# Patient Record
Sex: FEMALE | Race: BLACK OR AFRICAN AMERICAN | Employment: FULL TIME | URBAN - METROPOLITAN AREA
[De-identification: names, ages, dates, MRNs, and addresses within clinical notes are randomized per-mention and may not be internally consistent; named-entity substitution may affect disease eponyms.]

---

## 2023-10-24 ENCOUNTER — LAB ENCOUNTER (OUTPATIENT)
Dept: LAB | Facility: REFERENCE LAB | Age: 63
End: 2023-10-24
Attending: FAMILY MEDICINE

## 2023-10-24 ENCOUNTER — OFFICE VISIT (OUTPATIENT)
Facility: CLINIC | Age: 63
End: 2023-10-24

## 2023-10-24 VITALS
BODY MASS INDEX: 27.64 KG/M2 | WEIGHT: 172 LBS | DIASTOLIC BLOOD PRESSURE: 70 MMHG | SYSTOLIC BLOOD PRESSURE: 126 MMHG | HEIGHT: 66 IN | OXYGEN SATURATION: 98 % | HEART RATE: 68 BPM

## 2023-10-24 DIAGNOSIS — Z12.11 COLON CANCER SCREENING: ICD-10-CM

## 2023-10-24 DIAGNOSIS — M25.552 LEFT HIP PAIN: ICD-10-CM

## 2023-10-24 DIAGNOSIS — Z00.00 ANNUAL PHYSICAL EXAM: Primary | ICD-10-CM

## 2023-10-24 DIAGNOSIS — E78.00 HIGH BLOOD CHOLESTEROL: ICD-10-CM

## 2023-10-24 DIAGNOSIS — R73.9 HYPERGLYCEMIA: ICD-10-CM

## 2023-10-24 DIAGNOSIS — E87.6 HYPOKALEMIA: ICD-10-CM

## 2023-10-24 DIAGNOSIS — Z12.4 SCREENING FOR CERVICAL CANCER: ICD-10-CM

## 2023-10-24 DIAGNOSIS — I10 BENIGN ESSENTIAL HTN: ICD-10-CM

## 2023-10-24 DIAGNOSIS — Z12.31 VISIT FOR SCREENING MAMMOGRAM: ICD-10-CM

## 2023-10-24 LAB
ANION GAP SERPL CALC-SCNC: 8 MMOL/L (ref 0–18)
BASOPHILS # BLD AUTO: 0.04 X10(3) UL (ref 0–0.2)
BASOPHILS NFR BLD AUTO: 0.8 %
BUN BLD-MCNC: 17 MG/DL (ref 7–18)
BUN/CREAT SERPL: 21 (ref 10–20)
CALCIUM BLD-MCNC: 9.1 MG/DL (ref 8.5–10.1)
CHLORIDE SERPL-SCNC: 110 MMOL/L (ref 98–112)
CHOLEST SERPL-MCNC: 165 MG/DL (ref ?–200)
CO2 SERPL-SCNC: 26 MMOL/L (ref 21–32)
CREAT BLD-MCNC: 0.81 MG/DL
DEPRECATED RDW RBC AUTO: 42 FL (ref 35.1–46.3)
EGFRCR SERPLBLD CKD-EPI 2021: 82 ML/MIN/1.73M2 (ref 60–?)
EOSINOPHIL # BLD AUTO: 0.08 X10(3) UL (ref 0–0.7)
EOSINOPHIL NFR BLD AUTO: 1.7 %
ERYTHROCYTE [DISTWIDTH] IN BLOOD BY AUTOMATED COUNT: 14.1 % (ref 11–15)
EST. AVERAGE GLUCOSE BLD GHB EST-MCNC: 123 MG/DL (ref 68–126)
FASTING PATIENT LIPID ANSWER: YES
FASTING STATUS PATIENT QL REPORTED: YES
GLUCOSE BLD-MCNC: 103 MG/DL (ref 70–99)
HBA1C MFR BLD: 5.9 % (ref ?–5.7)
HCT VFR BLD AUTO: 40.6 %
HDLC SERPL-MCNC: 62 MG/DL (ref 40–59)
HGB BLD-MCNC: 12.9 G/DL
IMM GRANULOCYTES # BLD AUTO: 0.01 X10(3) UL (ref 0–1)
IMM GRANULOCYTES NFR BLD: 0.2 %
LDLC SERPL CALC-MCNC: 91 MG/DL (ref ?–100)
LYMPHOCYTES # BLD AUTO: 1.83 X10(3) UL (ref 1–4)
LYMPHOCYTES NFR BLD AUTO: 37.8 %
MCH RBC QN AUTO: 26.3 PG (ref 26–34)
MCHC RBC AUTO-ENTMCNC: 31.8 G/DL (ref 31–37)
MCV RBC AUTO: 82.7 FL
MONOCYTES # BLD AUTO: 0.41 X10(3) UL (ref 0.1–1)
MONOCYTES NFR BLD AUTO: 8.5 %
NEUTROPHILS # BLD AUTO: 2.47 X10 (3) UL (ref 1.5–7.7)
NEUTROPHILS # BLD AUTO: 2.47 X10(3) UL (ref 1.5–7.7)
NEUTROPHILS NFR BLD AUTO: 51 %
NONHDLC SERPL-MCNC: 103 MG/DL (ref ?–130)
OSMOLALITY SERPL CALC.SUM OF ELEC: 300 MOSM/KG (ref 275–295)
PLATELET # BLD AUTO: 339 10(3)UL (ref 150–450)
POTASSIUM SERPL-SCNC: 3.6 MMOL/L (ref 3.5–5.1)
RBC # BLD AUTO: 4.91 X10(6)UL
SODIUM SERPL-SCNC: 144 MMOL/L (ref 136–145)
TRIGL SERPL-MCNC: 58 MG/DL (ref 30–149)
VLDLC SERPL CALC-MCNC: 9 MG/DL (ref 0–30)
WBC # BLD AUTO: 4.8 X10(3) UL (ref 4–11)

## 2023-10-24 PROCEDURE — 88175 CYTOPATH C/V AUTO FLUID REDO: CPT | Performed by: FAMILY MEDICINE

## 2023-10-24 PROCEDURE — 3074F SYST BP LT 130 MM HG: CPT | Performed by: FAMILY MEDICINE

## 2023-10-24 PROCEDURE — 85025 COMPLETE CBC W/AUTO DIFF WBC: CPT | Performed by: FAMILY MEDICINE

## 2023-10-24 PROCEDURE — 3008F BODY MASS INDEX DOCD: CPT | Performed by: FAMILY MEDICINE

## 2023-10-24 PROCEDURE — 3078F DIAST BP <80 MM HG: CPT | Performed by: FAMILY MEDICINE

## 2023-10-24 PROCEDURE — 99386 PREV VISIT NEW AGE 40-64: CPT | Performed by: FAMILY MEDICINE

## 2023-10-24 PROCEDURE — 87624 HPV HI-RISK TYP POOLED RSLT: CPT | Performed by: FAMILY MEDICINE

## 2023-10-24 PROCEDURE — 80061 LIPID PANEL: CPT | Performed by: FAMILY MEDICINE

## 2023-10-24 PROCEDURE — 36415 COLL VENOUS BLD VENIPUNCTURE: CPT | Performed by: FAMILY MEDICINE

## 2023-10-24 PROCEDURE — 83036 HEMOGLOBIN GLYCOSYLATED A1C: CPT | Performed by: FAMILY MEDICINE

## 2023-10-24 PROCEDURE — 80048 BASIC METABOLIC PNL TOTAL CA: CPT | Performed by: FAMILY MEDICINE

## 2023-10-24 RX ORDER — HYDROCHLOROTHIAZIDE 25 MG/1
25 TABLET ORAL DAILY
COMMUNITY

## 2023-10-24 RX ORDER — POTASSIUM CHLORIDE 20 MEQ/1
1 TABLET, EXTENDED RELEASE ORAL DAILY
COMMUNITY
Start: 2023-08-02 | End: 2023-10-24

## 2023-10-24 RX ORDER — POTASSIUM CHLORIDE 20 MEQ/1
10 TABLET, EXTENDED RELEASE ORAL DAILY
Qty: 45 TABLET | Refills: 3 | Status: SHIPPED | OUTPATIENT
Start: 2023-10-24 | End: 2024-10-18

## 2023-10-24 RX ORDER — ATORVASTATIN CALCIUM 20 MG/1
20 TABLET, FILM COATED ORAL DAILY
COMMUNITY

## 2023-10-25 LAB — HPV I/H RISK 1 DNA SPEC QL NAA+PROBE: NEGATIVE

## 2023-10-26 PROBLEM — R73.03 PREDIABETES: Status: ACTIVE | Noted: 2023-10-26

## 2023-11-28 RX ORDER — ATORVASTATIN CALCIUM 20 MG/1
20 TABLET, FILM COATED ORAL DAILY
Qty: 90 TABLET | Refills: 3 | Status: SHIPPED | OUTPATIENT
Start: 2023-11-28

## 2023-11-28 NOTE — TELEPHONE ENCOUNTER
LISTED AS EXTERNAL/HISTORICAL. Please review; protocol failed/no protocol. Requested Prescriptions   Pending Prescriptions Disp Refills    atorvastatin 20 MG Oral Tab 90 tablet 1     Sig: Take 1 tablet (20 mg total) by mouth daily.        Cholesterol Medication Protocol Failed - 11/27/2023 10:38 AM        Failed - ALT in past 12 months        Failed - Last ALT < 80     No results found for: \"ALT\"          Passed - LDL in past 12 months        Passed - Last LDL < 130     Lab Results   Component Value Date    LDL 91 10/24/2023             Passed - In person appointment or virtual visit in the past 12 mos or appointment in next 3 mos     Recent Outpatient Visits              1 month ago Annual physical exam    [address], [address], [facility] Caden Munson MD    Office Visit          Future Appointments         Provider Department Appt Notes    In 3 months Vadim Brice MD [address], [address], [place] Hemorrhoid / Colon Ca Screening                     Recent Outpatient Visits              1 month ago Annual physical exam    [address], [address], [facility] Caden Munson MD    Office Visit             Future Appointments         Provider Department Appt Notes    In 3 months Vadim Brice MD [address], [address], [place] Hemorrhoid / Colon Ca Screening

## 2024-03-07 ENCOUNTER — TELEPHONE (OUTPATIENT)
Facility: CLINIC | Age: 64
End: 2024-03-07

## 2024-03-07 ENCOUNTER — OFFICE VISIT (OUTPATIENT)
Facility: CLINIC | Age: 64
End: 2024-03-07
Payer: COMMERCIAL

## 2024-03-07 VITALS
HEART RATE: 109 BPM | SYSTOLIC BLOOD PRESSURE: 145 MMHG | WEIGHT: 187 LBS | BODY MASS INDEX: 30.05 KG/M2 | HEIGHT: 66 IN | DIASTOLIC BLOOD PRESSURE: 87 MMHG

## 2024-03-07 DIAGNOSIS — Z12.11 COLON CANCER SCREENING: Primary | ICD-10-CM

## 2024-03-07 DIAGNOSIS — Z12.11 SCREEN FOR COLON CANCER: Primary | ICD-10-CM

## 2024-03-07 PROCEDURE — S0285 CNSLT BEFORE SCREEN COLONOSC: HCPCS | Performed by: INTERNAL MEDICINE

## 2024-03-07 NOTE — PATIENT INSTRUCTIONS
1. Schedule colonoscopy with [facility] [Diagnosis: Screening]    2.  bowel prep from pharmacy ([facility])  --Buy over the counter dulcolax laxative, and take one tablet daily for 3 days prior to drinking the bowel prep.       3. Continue all medications as normal for your procedure.    4. Read all bowel prep instructions carefully. Bowel prep instructions can also be found online at:  [email or web address]     5. AVOID seeds, nuts, popcorn, raw fruits and vegetables for 3 days before procedure    6. You MAY need to go for COVID testing 72 hours before procedure. The testing team will call you a few days before your procedure to discuss with you if testing is required. If you are asked to go for COVID testing and do not completed the test, the procedure cannot be performed.     7. If you start any NEW medication after your visit today, please notify us. Certain medications (like iron or weight loss medications) will need to be held before the procedure, or the procedure cannot be performed safely.

## 2024-03-07 NOTE — TELEPHONE ENCOUNTER
Scheduled for:  Colonoscopy [id number]  Provider Name:  Dr. Crystal   Date:  06/03/2024  Location:[facility]  Sedation:  MAC  Time:  10:30am (pt is aware that [facility] will call the day before to confirm arrival time)    Prep:  Trilyte Prep Instructions Given At The Office Visit.    Meds/Allergies Reconciled?:  Physician Reviewed   Diagnosis with codes:  Colon Screening Z12.11  Was patient informed to call insurance with codes (Y/N):  Yes  Referral sent?:  Referral was sent at the time of electronic surgical scheduling.  [facility] or [facility] notified?:  I sent an electronic request to Endo Scheduling and received a confirmation today.  Medication Orders:  Pt is aware to NOT take iron pills, herbal meds and diet supplements for 7 days before exam. Also to NOT take any form of alcohol, recreational drugs and any forms of ED meds 24 hours before exam.   Misc Orders:       Further instructions given by staff:  I provide prep instructions to patient at the time of the appointment and reviewed date, time and location, she verbalized that she understood and is aware to call if she has any questions.    Patient was informed about the new cancellation policy for his/her procedure. Patient was also given a copy of the cancellation policy at the time of the appointment and verbalized understanding.

## 2024-03-07 NOTE — H&P
[facility] - Gastroenterology                                                                                                  Clinic History and Physical     Chief Complaint   Patient presents with    Consult       Requesting physician or provider: Lima Prajapati MD    HPI:   Clhoe Kennedy is a 64 year old year-old female with history of HL, rhinitis, who presents for colon cancer screening evaluation.    Patient denies any GI symptoms of nausea, vomiting, dyspepsia, dysphagia, hematemesis, abdominal pain, change in bowel habits, thin stools, hematochezia, or melena.  Additionally there is no weight loss and no reported history of chest pain or shortness of breath.  -no family history of colon cancer.  -no significant constipation issues.    Prior endoscopies:  2015 - [facility] - no sig findings, no polyps    Soc:  -No smoking  -Social Etoh    History, Medications, Allergies, ROS:      Past Medical History:   Diagnosis Date    Allergic rhinitis 2010    Hyperlipidemia       Past Surgical History:   Procedure Laterality Date    COLONOSCOPY      OTHER SURGICAL HISTORY  2015    Cosmetic - upper arm reduction    TUBAL LIGATION  1987      Family Hx:   Family History   Problem Relation Age of Onset    Diabetes Mother     Hypertension Mother     Diabetes Father     Hypertension Father     Cancer Maternal Grandfather     Cancer Maternal Grandmother     Asthma Daughter       Social History:   Social History     Socioeconomic History    Marital status:    Tobacco Use    Smoking status: Never    Smokeless tobacco: Never   Substance and Sexual Activity    Alcohol use: Yes     Comment: One glass per month    Drug use: Never   Other Topics Concern    Caffeine Concern No    Exercise Yes     Comment: Need to do more    Seat Belt Yes    Special Diet No    Stress Concern Yes     Comment: Relocating    Weight Concern Yes     Comment: Working  to achieve the right number of pounds.        Medications (Active prior to today's visit):  Current Outpatient Medications   Medication Sig Dispense Refill    polyethylene glycol, PEG 3350-KCl-NaBcb-NaCl-NaSulf, 236 g Oral Recon Soln Take 4,000 mL by mouth once for 1 dose. As directed by GI clinic instructions. 4000 mL 0    atorvastatin 20 MG Oral Tab Take 1 tablet (20 mg total) by mouth daily. 90 tablet 3    hydroCHLOROthiazide 25 MG Oral Tab Take 1 tablet (25 mg total) by mouth daily.      potassium chloride 20 MEQ Oral Tab CR Take 0.5 tablets (10 mEq total) by mouth daily. Patient takes 1/2 a tablet 45 tablet 3       Allergies:  No Known Allergies    ROS:   CONSTITUTIONAL:  negative for fevers, rigors  EYES:  negative for diplopia   RESPIRATORY:  negative for severe shortness of breath  CARDIOVASCULAR:  negative for crushing sub-sternal chest pain  GASTROINTESTINAL:  see HPI  GENITOURINARY:  negative for dysuria or gross hematuria  INTEGUMENT/BREAST:  SKIN:  negative for jaundice   ALLERGIC/IMMUNOLOGIC:  negative for hay fever  ENDOCRINE:  negative for cold intolerance and heat intolerance  MUSCULOSKELETAL:  negative for joint effusion/severe erythema  BEHAVIOR/PSYCH:  negative for psychotic behavior      PHYSICAL EXAM:   Blood pressure 145/87, pulse 109, height 5' 6\" (1.676 m), weight 187 lb (84.8 kg).    Gen- Patient appears comfortable and in no acute discomfort  HEENT: the sclera appears anicteric, oropharynx clear, mucus membranes appear moist  CV- regular rate and rhythm, the extremities are warm and well perfused   Lung- Moves air well; No labored breathing  Abdomen- soft, non-tender exam in all quadrants without rigidity or guarding, non-distended, no abnormal bowel sounds noted, no masses are palpated  Skin- No jaundice  Ext: no cyanosis, clubbing or edema is evident.   Neuro- Alert and interactive, and gross movements of extremities normal    Labs/Imaging:     Patient's labs and imaging were reviewed  and discussed with patient today.      .  ASSESSMENT/PLAN:   Chloe Kennedy is a 64 year old year-old female with history of HL, rhinitis, who presents for colon cancer screening evaluation. NO anemia noted on lab work.    # Average Risk screening: patient is considered average risk for colon cancer (NO family hx of colon cancer) and it is appropriate to proceed with screening colonoscopy. Patient is currently asymptomatic and denies diarrhea, hematochezia, thin-stools or weight loss. We discussed risks/benefits/alternatives to procedure, including CT colonography and stool testing, they want to proceed with colonoscopy.    Recommend:  1. Schedule colonoscopy with [facility] [Diagnosis: Screening]    2.  bowel prep from pharmacy ([facility])  --Buy over the counter dulcolax laxative, and take one tablet daily for 3 days prior to drinking the bowel prep.       3. Continue all medications as normal for your procedure.    4. Read all bowel prep instructions carefully. Bowel prep instructions can also be found online at:  [email or web address]     5. AVOID seeds, nuts, popcorn, raw fruits and vegetables for 3 days before procedure    6. You MAY need to go for COVID testing 72 hours before procedure. The testing team will call you a few days before your procedure to discuss with you if testing is required. If you are asked to go for COVID testing and do not completed the test, the procedure cannot be performed.     7. If you start any NEW medication after your visit today, please notify us. Certain medications (like iron or weight loss medications) will need to be held before the procedure, or the procedure cannot be performed safely.      Colonoscopy consent: I have discussed the risks, benefits, and alternatives to colonoscopy with the patient [who demonstrated understanding], including but not limited to the risks of bleeding, infection, pain, death, as well as the risks of anesthesia and perforation all leading  to prolonged hospitalization, surgical intervention, or even death. I also specifically mentioned the miss rate of colonoscopy of 5-10% in the best of all circumstances. All questions were answered to the patient’s satisfaction. The patient signed informed consent and elected to proceed with colonoscopy with intervention [i.e. polypectomy, stent placement, etc.] as indicated.      Orders This Visit:  No orders of the defined types were placed in this encounter.      Meds This Visit:  Requested Prescriptions     Signed Prescriptions Disp Refills    polyethylene glycol, PEG 3350-KCl-NaBcb-NaCl-NaSulf, 236 g Oral Recon Soln 4000 mL 0     Sig: Take 4,000 mL by mouth once for 1 dose. As directed by GI clinic instructions.       Imaging & Referrals:  None         DIMITRY Crystal MD  Pager: [phone]  3/7/2024

## 2024-03-27 RX ORDER — HYDROCHLOROTHIAZIDE 25 MG/1
25 TABLET ORAL DAILY
Qty: 90 TABLET | Refills: 0 | Status: SHIPPED | OUTPATIENT
Start: 2024-03-27

## 2024-03-27 NOTE — TELEPHONE ENCOUNTER
Please review; protocol failed/ has no protocol    Medication is listed as patient reported.    Requested Prescriptions   Pending Prescriptions Disp Refills    hydroCHLOROthiazide 25 MG Oral Tab  0     Sig: Take 1 tablet (25 mg total) by mouth daily.       Hypertension Medications Protocol Failed - 3/25/2024  4:04 PM        Failed - Last BP reading less than 140/90     BP Readings from Last 1 Encounters:   03/07/24 145/87               Passed - CMP or BMP in past 12 months        Passed - In person appointment or virtual visit in the past 12 mos or appointment in next 3 mos     Recent Outpatient Visits              2 [facility]    [facility]    5 months ago Annual physical exam    [facility] Lima Glynn MD    Office Visit          Future Appointments         Provider Department Appt Notes    [facility] 2 [facility]               Passed - EGFRCR or GFRAA > 50     GFR Evaluation  EGFRCR: 82 , resulted on 10/24/2023             Recent Outpatient Visits              2 weeks ago [facility] colon [facility]    Office Visit    5 months ago Annual physical exam    [facility] Lima Glynn MD    Office Visit          Future Appointments         Provider Department Appt Notes    In 2 months [facility]

## 2024-04-11 ENCOUNTER — TELEPHONE (OUTPATIENT)
Facility: CLINIC | Age: 64
End: 2024-04-11

## 2024-04-13 ENCOUNTER — TELEPHONE (OUTPATIENT)
Facility: CLINIC | Age: 64
End: 2024-04-13

## 2024-04-18 ENCOUNTER — OFFICE VISIT (OUTPATIENT)
Facility: CLINIC | Age: 64
End: 2024-04-18
Payer: COMMERCIAL

## 2024-04-18 VITALS
OXYGEN SATURATION: 100 % | HEART RATE: 67 BPM | SYSTOLIC BLOOD PRESSURE: 136 MMHG | DIASTOLIC BLOOD PRESSURE: 78 MMHG | WEIGHT: 188.13 LBS | BODY MASS INDEX: 30 KG/M2

## 2024-04-18 DIAGNOSIS — Z23 NEED FOR VACCINATION: ICD-10-CM

## 2024-04-18 DIAGNOSIS — I10 BENIGN ESSENTIAL HTN: Primary | ICD-10-CM

## 2024-04-18 PROCEDURE — 90471 IMMUNIZATION ADMIN: CPT | Performed by: FAMILY MEDICINE

## 2024-04-18 PROCEDURE — 96127 BRIEF EMOTIONAL/BEHAV ASSMT: CPT | Performed by: FAMILY MEDICINE

## 2024-04-18 PROCEDURE — 99213 OFFICE O/P EST LOW 20 MIN: CPT | Performed by: FAMILY MEDICINE

## 2024-04-18 PROCEDURE — 90715 TDAP VACCINE 7 YRS/> IM: CPT | Performed by: FAMILY MEDICINE

## 2024-04-18 NOTE — PROGRESS NOTES
Subjective:   Chloe Kennedy is a 64 year old female who presents for Hypertension (Patient comes to the office to follow up on hypertension. )     Patient presents for follow up BP. Doing well on hydrochlorothiazide. Patient checks BP at home max 131-113/84-71    Health Maintenance   Has colonoscopy scheduled for 6/3/24  Mammogram ordered 10/2023      History/Other:    Chief Complaint Reviewed and Verified  Nursing Notes Reviewed and   Verified  Tobacco Reviewed  Allergies Reviewed  Medications Reviewed    Problem List Reviewed  Medical History Reviewed  Surgical History   Reviewed  Family History Reviewed  Social History Reviewed         Tobacco:  She has never smoked tobacco.    Current Outpatient Medications   Medication Sig Dispense Refill    hydroCHLOROthiazide 25 MG Oral Tab Take 1 tablet (25 mg total) by mouth daily. 90 tablet 0    atorvastatin 20 MG Oral Tab Take 1 tablet (20 mg total) by mouth daily. 90 tablet 3    potassium chloride 20 MEQ Oral Tab CR Take 0.5 tablets (10 mEq total) by mouth daily. Patient takes 1/2 a tablet 45 tablet 3         Review of Systems:  Review of Systems   Constitutional: Negative.    Respiratory: Negative.     Cardiovascular: Negative.    Gastrointestinal: Negative.    Skin: Negative.    Neurological: Negative.          Objective:   /78 (BP Location: Left arm, Patient Position: Sitting, Cuff Size: adult)   Pulse 67   Wt 188 lb 2 oz (85.3 kg)   SpO2 100%   BMI 30.36 kg/m²  Estimated body mass index is 30.36 kg/m² as calculated from the following:    Height as of 3/7/24: 5' 6\" (1.676 m).    Weight as of this encounter: 188 lb 2 oz (85.3 kg).  Physical Exam  Vitals and nursing note reviewed.   Constitutional:       General: She is not in acute distress.     Appearance: Normal appearance.   HENT:      Head: Normocephalic and atraumatic.   Eyes:      General:         Right eye: No discharge.         Left eye: No discharge.      Comments: Extraocular eye movements  grossly intact   Pulmonary:      Effort: Pulmonary effort is normal.   Musculoskeletal:      Comments: Normal gait   Skin:     Findings: No rash.   Neurological:      General: No focal deficit present.      Mental Status: She is alert. Mental status is at baseline.   Psychiatric:         Mood and Affect: Mood normal.         Behavior: Behavior normal.         Assessment & Plan:   1. Benign essential HTN (Primary)  BP well controlled. Remains under goal of less than 140/90. Patient thinks elevated blood pressure at Dr Crystal's office was due to some health anxiety    2. Need for vaccination  -     TETANUS, DIPHTHERIA TOXOIDS AND ACELLULAR PERTUSIS VACCINE (TDAP), >7 YEARS, IM USE    -administered by staff  -also counseled on shingrix. Patient will continue to think about this vaccine      Return in about 3 months (around 7/18/2024) for HTN.    Lima Prajapati MD, 4/18/2024, 6:29 PM

## 2024-04-29 ENCOUNTER — PATIENT MESSAGE (OUTPATIENT)
Facility: CLINIC | Age: 64
End: 2024-04-29

## 2024-04-29 NOTE — TELEPHONE ENCOUNTER
Farzad April, RN 4/29/2024 9:10 AM CDT        ----- Message -----  From: Chloe Kennedy  Sent: 4/29/2024 7:43 AM CDT  To: Em Triage Support  Subject: Week of 4/22 BP readings     Hi Dr. Prajapati,   Please find attached last week’s BP results.     Chloe Lopez

## 2024-05-30 ENCOUNTER — HOSPITAL ENCOUNTER (OUTPATIENT)
Dept: MAMMOGRAPHY | Age: 64
Discharge: HOME | End: 2024-05-30
Attending: FAMILY MEDICINE
Payer: COMMERCIAL

## 2024-05-30 DIAGNOSIS — Z12.31 VISIT FOR SCREENING MAMMOGRAM: ICD-10-CM

## 2024-05-30 PROCEDURE — 77067 SCR MAMMO BI INCL CAD: CPT | Performed by: FAMILY MEDICINE

## 2024-05-30 PROCEDURE — 77063 BREAST TOMOSYNTHESIS BI: CPT | Performed by: FAMILY MEDICINE

## 2024-06-03 PROBLEM — K63.5 POLYP OF COLON: Status: ACTIVE | Noted: 2024-06-03

## 2024-06-03 PROCEDURE — 88305 TISSUE EXAM BY PATHOLOGIST: CPT | Performed by: INTERNAL MEDICINE

## 2024-06-25 ENCOUNTER — PATIENT MESSAGE (OUTPATIENT)
Facility: CLINIC | Age: 64
End: 2024-06-25

## 2024-06-25 ENCOUNTER — TELEPHONE (OUTPATIENT)
Facility: CLINIC | Age: 64
End: 2024-06-25

## 2024-06-25 RX ORDER — HYDROCHLOROTHIAZIDE 25 MG/1
25 TABLET ORAL DAILY
Qty: 90 TABLET | Refills: 3 | Status: SHIPPED | OUTPATIENT
Start: 2024-06-25

## 2024-06-25 NOTE — TELEPHONE ENCOUNTER
Please review. Protocol Failed; No Protocol    Requested Prescriptions   Pending Prescriptions Disp Refills    HYDROCHLOROTHIAZIDE 25 MG Oral Tab [Pharmacy Med Name: HYDROCHLOROTHIAZIDE 25 MG TAB] 90 tablet 0     Sig: TAKE 1 TABLET BY MOUTH EVERY DAY       Hypertension Medications Protocol Failed - 6/22/2024  7:02 AM        Failed - Last BP reading less than 140/90     BP Readings from Last 1 Encounters:   06/03/24 144/86               Passed - CMP or BMP in past 12 months        Passed - In person appointment or virtual visit in the past 12 mos or appointment in next 3 mos     Recent Outpatient Visits              2 months ago Benign essential HTN    [facility] Lima Glynn MD    Office Visit    3 months ago Screen for colon cancer    [facility]    Office Visit    8 months ago Annual physical exam    [facility] Lima Glynn MD    Office Visit                      Passed - EGFRCR or GFRAA > 50     GFR Evaluation  EGFRCR: 82 , resulted on 10/24/2023                   Recent Outpatient Visits              2 months ago [facility]ign essential HT[facility]    [facility]    3 months ago Screen for colon cancer    [facility]    Office Visit    8 months ago Annual physical exam    [facility] Lima Glynn MD    Office Visit

## 2024-06-25 NOTE — TELEPHONE ENCOUNTER
I mailed out colonoscopy results letter to pt  Updated health maintenance  Entered into 5 yr CLN recall  Recall colon in 5 years per. Colon done 6/03/2024      LUANA Crystal MD  P Em Gi Clinical Staff  GI staff: please place recall for colonoscopy in 5 years

## 2024-06-26 NOTE — TELEPHONE ENCOUNTER
From: Chloe Kennedy  To: Lima Prajapati  Sent: 6/25/2024 4:26 PM CDT  Subject: Mammogram     Greetings Dr. Prajapati,  I linked my [facility] account in an effort to obtain my prior imaging.   I also attached the commentary.     Kind regards,  Chloe Kennedy

## 2024-06-27 NOTE — TELEPHONE ENCOUNTER
Can we assist in sending this to the radiology department? They were trying to compare previous mammograms. Thank you    Lima Prajapati MD, 06/27/24, 8:17 AM

## 2024-12-12 ENCOUNTER — OFFICE VISIT (OUTPATIENT)
Facility: CLINIC | Age: 64
End: 2024-12-12
Payer: COMMERCIAL

## 2024-12-12 ENCOUNTER — HOSPITAL ENCOUNTER (OUTPATIENT)
Dept: GENERAL RADIOLOGY | Age: 64
Discharge: HOME | End: 2024-12-12
Attending: FAMILY MEDICINE
Payer: COMMERCIAL

## 2024-12-12 ENCOUNTER — LAB ENCOUNTER (OUTPATIENT)
Dept: LAB | Facility: REFERENCE LAB | Age: 64
End: 2024-12-12
Attending: FAMILY MEDICINE
Payer: COMMERCIAL

## 2024-12-12 VITALS
WEIGHT: 189 LBS | SYSTOLIC BLOOD PRESSURE: 138 MMHG | BODY MASS INDEX: 30.37 KG/M2 | HEART RATE: 67 BPM | DIASTOLIC BLOOD PRESSURE: 86 MMHG | HEIGHT: 66 IN | OXYGEN SATURATION: 97 %

## 2024-12-12 DIAGNOSIS — E78.00 HIGH BLOOD CHOLESTEROL: ICD-10-CM

## 2024-12-12 DIAGNOSIS — M25.572 CHRONIC PAIN OF LEFT ANKLE: ICD-10-CM

## 2024-12-12 DIAGNOSIS — E87.6 HYPOKALEMIA: ICD-10-CM

## 2024-12-12 DIAGNOSIS — S99.919A TRAUMATIC INJURY OF ANKLE: ICD-10-CM

## 2024-12-12 DIAGNOSIS — R73.03 PREDIABETES: ICD-10-CM

## 2024-12-12 DIAGNOSIS — G89.29 CHRONIC PAIN OF LEFT ANKLE: ICD-10-CM

## 2024-12-12 DIAGNOSIS — I10 BENIGN ESSENTIAL HTN: ICD-10-CM

## 2024-12-12 DIAGNOSIS — Z00.01 ENCOUNTER FOR GENERAL ADULT MEDICAL EXAMINATION WITH ABNORMAL FINDINGS: Primary | ICD-10-CM

## 2024-12-12 LAB
ANION GAP SERPL CALC-SCNC: 7 MMOL/L (ref 0–18)
BASOPHILS # BLD AUTO: 0.03 X10(3) UL (ref 0–0.2)
BASOPHILS NFR BLD AUTO: 0.5 %
BUN BLD-MCNC: 11 MG/DL (ref 9–23)
BUN/CREAT SERPL: 14.5 (ref 10–20)
CALCIUM BLD-MCNC: 9.8 MG/DL (ref 8.7–10.4)
CHLORIDE SERPL-SCNC: 104 MMOL/L (ref 98–112)
CHOLEST SERPL-MCNC: 192 MG/DL (ref ?–200)
CO2 SERPL-SCNC: 31 MMOL/L (ref 21–32)
CREAT BLD-MCNC: 0.76 MG/DL
DEPRECATED RDW RBC AUTO: 44.2 FL (ref 35.1–46.3)
EGFRCR SERPLBLD CKD-EPI 2021: 88 ML/MIN/1.73M2 (ref 60–?)
EOSINOPHIL # BLD AUTO: 0.11 X10(3) UL (ref 0–0.7)
EOSINOPHIL NFR BLD AUTO: 1.8 %
ERYTHROCYTE [DISTWIDTH] IN BLOOD BY AUTOMATED COUNT: 14.9 % (ref 11–15)
EST. AVERAGE GLUCOSE BLD GHB EST-MCNC: 134 MG/DL (ref 68–126)
FASTING PATIENT LIPID ANSWER: YES
FASTING STATUS PATIENT QL REPORTED: YES
GLUCOSE BLD-MCNC: 104 MG/DL (ref 70–99)
HBA1C MFR BLD: 6.3 % (ref ?–5.7)
HCT VFR BLD AUTO: 41.6 %
HDLC SERPL-MCNC: 52 MG/DL (ref 40–59)
HGB BLD-MCNC: 13.5 G/DL
IMM GRANULOCYTES # BLD AUTO: 0.02 X10(3) UL (ref 0–1)
IMM GRANULOCYTES NFR BLD: 0.3 %
LDLC SERPL CALC-MCNC: 115 MG/DL (ref ?–100)
LYMPHOCYTES # BLD AUTO: 2.25 X10(3) UL (ref 1–4)
LYMPHOCYTES NFR BLD AUTO: 36 %
MCH RBC QN AUTO: 26.4 PG (ref 26–34)
MCHC RBC AUTO-ENTMCNC: 32.5 G/DL (ref 31–37)
MCV RBC AUTO: 81.4 FL
MONOCYTES # BLD AUTO: 0.44 X10(3) UL (ref 0.1–1)
MONOCYTES NFR BLD AUTO: 7 %
NEUTROPHILS # BLD AUTO: 3.4 X10 (3) UL (ref 1.5–7.7)
NEUTROPHILS # BLD AUTO: 3.4 X10(3) UL (ref 1.5–7.7)
NEUTROPHILS NFR BLD AUTO: 54.4 %
NONHDLC SERPL-MCNC: 140 MG/DL (ref ?–130)
OSMOLALITY SERPL CALC.SUM OF ELEC: 294 MOSM/KG (ref 275–295)
PLATELET # BLD AUTO: 438 10(3)UL (ref 150–450)
POTASSIUM SERPL-SCNC: 3.4 MMOL/L (ref 3.5–5.1)
RBC # BLD AUTO: 5.11 X10(6)UL
SODIUM SERPL-SCNC: 142 MMOL/L (ref 136–145)
TRIGL SERPL-MCNC: 143 MG/DL (ref 30–149)
TSI SER-ACNC: 0.92 UIU/ML (ref 0.55–4.78)
VLDLC SERPL CALC-MCNC: 25 MG/DL (ref 0–30)
WBC # BLD AUTO: 6.3 X10(3) UL (ref 4–11)

## 2024-12-12 PROCEDURE — 80061 LIPID PANEL: CPT | Performed by: FAMILY MEDICINE

## 2024-12-12 PROCEDURE — 73610 X-RAY EXAM OF ANKLE: CPT | Performed by: FAMILY MEDICINE

## 2024-12-12 PROCEDURE — 99396 PREV VISIT EST AGE 40-64: CPT | Performed by: FAMILY MEDICINE

## 2024-12-12 PROCEDURE — 83036 HEMOGLOBIN GLYCOSYLATED A1C: CPT | Performed by: FAMILY MEDICINE

## 2024-12-12 PROCEDURE — 84443 ASSAY THYROID STIM HORMONE: CPT | Performed by: FAMILY MEDICINE

## 2024-12-12 PROCEDURE — 99214 OFFICE O/P EST MOD 30 MIN: CPT | Performed by: FAMILY MEDICINE

## 2024-12-12 PROCEDURE — 90656 IIV3 VACC NO PRSV 0.5 ML IM: CPT | Performed by: FAMILY MEDICINE

## 2024-12-12 PROCEDURE — 80048 BASIC METABOLIC PNL TOTAL CA: CPT | Performed by: FAMILY MEDICINE

## 2024-12-12 PROCEDURE — 36415 COLL VENOUS BLD VENIPUNCTURE: CPT | Performed by: FAMILY MEDICINE

## 2024-12-12 PROCEDURE — 85025 COMPLETE CBC W/AUTO DIFF WBC: CPT | Performed by: FAMILY MEDICINE

## 2024-12-12 PROCEDURE — 90471 IMMUNIZATION ADMIN: CPT | Performed by: FAMILY MEDICINE

## 2024-12-12 RX ORDER — POTASSIUM CHLORIDE 1.5 G/1.58G
POWDER, FOR SOLUTION ORAL
COMMUNITY
Start: 2019-10-09

## 2024-12-12 NOTE — PROGRESS NOTES
Subjective:   Chloe Kennedy is a 64 year old female who presents for Physical     Patient presents for physical and concern for left ankle pain and back pain    Left ankle pain  Patient is unsure if hit ankle with door in achilles area. Went to urgent care who thinks maybe sprain. Patient has been icing, elevating, stretching and using compression sleeve. Pain remains intermittent. Cold seems to aggravate.     HTN  On hydrochlorothiazide. Did take today. Did not take Potassium today in preparation for labs. At home Bps ranging from 131-127/87-89 over this past month.     HLD  Doing well on statin. Did take today        History/Other:    Chief Complaint Reviewed and Verified  No Further Nursing Notes to   Review  Tobacco Reviewed  Allergies Reviewed  Medications Reviewed    Problem List Reviewed  Medical History Reviewed  Surgical History   Reviewed  OB Status Reviewed  Family History Reviewed  Social History   Reviewed         Tobacco:  She has never smoked tobacco.    Current Outpatient Medications   Medication Sig Dispense Refill    potassium chloride (KLOR-CON) 20 MEQ Oral Powd Pack       hydroCHLOROthiazide 25 MG Oral Tab Take 1 tablet (25 mg total) by mouth daily. 90 tablet 3    atorvastatin 20 MG Oral Tab Take 1 tablet (20 mg total) by mouth daily. 90 tablet 3         Review of Systems:  Review of Systems   Constitutional: Negative.    HENT: Negative.     Eyes: Negative.    Respiratory: Negative.     Cardiovascular: Negative.    Gastrointestinal: Negative.    Genitourinary: Negative.    Musculoskeletal:         +left ankle pain   Skin: Negative.    Neurological: Negative.    Psychiatric/Behavioral: Negative.           Objective:   /86   Pulse 67   Ht 5' 6\" (1.676 m)   Wt 189 lb (85.7 kg)   SpO2 97%   BMI 30.51 kg/m²  Estimated body mass index is 30.51 kg/m² as calculated from the following:    Height as of this encounter: 5' 6\" (1.676 m).    Weight as of this encounter: 189 lb (85.7  kg).  Physical Exam  Vitals and nursing note reviewed.   Constitutional:       General: She is not in acute distress.     Appearance: Normal appearance. She is not ill-appearing.   HENT:      Head: Normocephalic and atraumatic.      Right Ear: Tympanic membrane normal. There is no impacted cerumen.      Left Ear: Tympanic membrane normal. There is no impacted cerumen.      Mouth/Throat:      Mouth: Mucous membranes are moist.      Pharynx: Oropharynx is clear. No oropharyngeal exudate or posterior oropharyngeal erythema.   Eyes:      General:         Right eye: No discharge.         Left eye: No discharge.      Extraocular Movements: Extraocular movements intact.      Pupils: Pupils are equal, round, and reactive to light.   Cardiovascular:      Rate and Rhythm: Normal rate and regular rhythm.      Heart sounds: Normal heart sounds. No murmur heard.     No friction rub. No gallop.   Pulmonary:      Effort: Pulmonary effort is normal.      Breath sounds: Normal breath sounds. No wheezing, rhonchi or rales.   Abdominal:      General: Abdomen is flat. Bowel sounds are normal. There is no distension.      Palpations: Abdomen is soft. There is no mass.      Tenderness: There is no abdominal tenderness. There is no guarding or rebound.   Musculoskeletal:         General: Normal range of motion.      Cervical back: Normal range of motion.      Right lower leg: No edema.      Left lower leg: No edema.      Comments: +atalgic gait    +dorsiflexion of left ankle with calf squeeze test  +tenderness lateral to left achilles tendon bilaterally   Skin:     General: Skin is warm and dry.      Findings: No rash.   Neurological:      General: No focal deficit present.      Mental Status: She is alert. Mental status is at baseline.   Psychiatric:         Mood and Affect: Mood normal.         Behavior: Behavior normal.         Assessment & Plan:   1. Encounter for general adult medical examination with abnormal findings (Primary)  -      Basic Metabolic Panel (8)  -     Hemoglobin A1C  -     Lipid Panel  -     TSH W Reflex To Free T4  -     CBC With Differential With Platelet  -     Fluzone trivalent vaccine, PF 0.5mL, 6mo+ (75442)    -ordered annual labs    2. Chronic pain of left ankle  3. Traumatic injury of ankle  -     XR ANKLE (MIN 3 VIEWS), LEFT (CPT=73610); Future; Expected date: 12/12/2024    -given persistent pain and limping with traumatic left ankle injury despite home PT, ice elevation and compression, Ordered xray to further assess. If xray normal will attempt to order MRI    4. Benign essential HTN  -stable well controlled on hydrochlorothiazide. No change to regimen for now    5. Prediabetes  -stable doing well. Ordered updated a1c    6. Hypokalemia  Medication induced from hydrochlorothiazide. Doing well on klorcon. Ordered updated BMP today    7. High blood cholesterol  -stable doing well on statin. Ordered updated lipid panel      Return in about 6 months (around 6/12/2025) for HTN.    Lima Prajapati MD, 12/12/2024, 9:43 AM

## 2024-12-14 DIAGNOSIS — R93.6 ABNORMAL X-RAY OF LOWER EXTREMITY: ICD-10-CM

## 2024-12-14 DIAGNOSIS — G89.29 CHRONIC PAIN OF LEFT ANKLE: Primary | ICD-10-CM

## 2024-12-14 DIAGNOSIS — M25.572 CHRONIC PAIN OF LEFT ANKLE: Primary | ICD-10-CM

## 2024-12-14 DIAGNOSIS — S99.919A TRAUMATIC INJURY OF ANKLE: ICD-10-CM

## 2024-12-18 DIAGNOSIS — E87.6 HYPOKALEMIA: ICD-10-CM

## 2024-12-20 RX ORDER — POTASSIUM CHLORIDE 1500 MG/1
20 TABLET, EXTENDED RELEASE ORAL DAILY
Qty: 90 TABLET | Refills: 0 | Status: SHIPPED | OUTPATIENT
Start: 2024-12-20

## 2024-12-20 RX ORDER — ATORVASTATIN CALCIUM 20 MG/1
20 TABLET, FILM COATED ORAL DAILY
Qty: 90 TABLET | Refills: 0 | Status: SHIPPED | OUTPATIENT
Start: 2024-12-20

## 2024-12-20 NOTE — TELEPHONE ENCOUNTER
Please review; protocol failed.    Patient states she is almost out of medication and is going out of town on 12/22/24. She states she will be back in town on 12/29/24 and can do required lab tests if needed when she returns. She is asking that these requests be addressed with high priority.      Requested Prescriptions   Pending Prescriptions Disp Refills    KLOR-CON M20 20 MEQ Oral Tab CR [Pharmacy Med Name: KLOR-CON M20 TABLET] 45 tablet 3     Sig: TAKE 0.5 TABLET (10 MEQ TOTAL) BY MOUTH DAILY.       There is no refill protocol information for this order       ATORVASTATIN 20 MG Oral Tab [Pharmacy Med Name: ATORVASTATIN 20 MG TABLET] 90 tablet 3     Sig: TAKE 1 TABLET BY MOUTH EVERY DAY       Cholesterol Medication Protocol Failed - 12/20/2024  3:14 PM        Failed - ALT < 80     No results found for: \"ALT\"          Failed - ALT resulted within past year        Passed - Lipid panel within past 12 months     Lab Results   Component Value Date    CHOLEST 192 12/12/2024    TRIG 143 12/12/2024    HDL 52 12/12/2024     (H) 12/12/2024    VLDL 25 12/12/2024    NONHDLC 140 (H) 12/12/2024             Passed - In person appointment or virtual visit in the past 12 mos or appointment in next 3 mos     Recent Outpatient Visits              1 week ago Encounter for general adult medical examination with abnormal findings    [facility] Lima Prajapati MD    Office Visit    8 months ago Benign essential HTN    [facility] Lima Glynn MD    Office Visit    9 months ago Screen for colon cancer    [facility] LUANA Crystal MD    Office Visit    1 year ago Annual physical exam    [facility] Lima Glynn MD    Office Visit          Future Appointments         Provider Department Appt Notes    In 3 weeks [facility] MRI MOBILE (1.5T) [facility] MRI                           Future Appointments         Provider Department Appt Notes    In 3 weeks [facility] MRI MOBILE (1.5T) [facility] MRI             Recent Outpatient Visits              1 week ago Encounter for general adult medical examination with abnormal findings    [facility] Lima Glynn MD    Office Visit    8 months ago Benign essential HTN    [facility] Lima Prajapati MD    Office Visit    9 months ago Screen for colon cancer    [facility] LUANA Crystal MD    Office Visit    1 year ago Annual physical exam    [facility] Lima Glynn MD    Office Visit

## 2024-12-20 NOTE — TELEPHONE ENCOUNTER
Verified name and .    Patient calling to follow up on refill requests- states that she needs them as soon as possible because she is going out of town on 24.

## 2024-12-30 ENCOUNTER — LAB ENCOUNTER (OUTPATIENT)
Dept: LAB | Age: 64
End: 2024-12-30
Attending: FAMILY MEDICINE
Payer: COMMERCIAL

## 2024-12-30 DIAGNOSIS — E87.6 HYPOKALEMIA: ICD-10-CM

## 2024-12-30 LAB
ANION GAP SERPL CALC-SCNC: 8 MMOL/L (ref 0–18)
BUN BLD-MCNC: 14 MG/DL (ref 9–23)
BUN/CREAT SERPL: 17.7 (ref 10–20)
CALCIUM BLD-MCNC: 9.1 MG/DL (ref 8.7–10.4)
CHLORIDE SERPL-SCNC: 107 MMOL/L (ref 98–112)
CO2 SERPL-SCNC: 29 MMOL/L (ref 21–32)
CREAT BLD-MCNC: 0.79 MG/DL
EGFRCR SERPLBLD CKD-EPI 2021: 84 ML/MIN/1.73M2 (ref 60–?)
FASTING STATUS PATIENT QL REPORTED: NO
GLUCOSE BLD-MCNC: 112 MG/DL (ref 70–99)
OSMOLALITY SERPL CALC.SUM OF ELEC: 299 MOSM/KG (ref 275–295)
POTASSIUM SERPL-SCNC: 3.5 MMOL/L (ref 3.5–5.1)
SODIUM SERPL-SCNC: 144 MMOL/L (ref 136–145)

## 2024-12-30 PROCEDURE — 80048 BASIC METABOLIC PNL TOTAL CA: CPT

## 2024-12-30 PROCEDURE — 36415 COLL VENOUS BLD VENIPUNCTURE: CPT

## 2025-01-11 ENCOUNTER — HOSPITAL ENCOUNTER (OUTPATIENT)
Dept: MRI IMAGING | Facility: HOSPITAL | Age: 65
Discharge: HOME | End: 2025-01-11
Attending: FAMILY MEDICINE
Payer: COMMERCIAL

## 2025-01-11 DIAGNOSIS — G89.29 CHRONIC PAIN OF LEFT ANKLE: ICD-10-CM

## 2025-01-11 DIAGNOSIS — S99.919A TRAUMATIC INJURY OF ANKLE: ICD-10-CM

## 2025-01-11 DIAGNOSIS — M25.572 CHRONIC PAIN OF LEFT ANKLE: ICD-10-CM

## 2025-01-11 DIAGNOSIS — R93.6 ABNORMAL X-RAY OF LOWER EXTREMITY: ICD-10-CM

## 2025-01-11 PROCEDURE — 73721 MRI JNT OF LWR EXTRE W/O DYE: CPT | Performed by: FAMILY MEDICINE

## 2025-01-14 DIAGNOSIS — S86.012S ACHILLES TENDON TEAR, LEFT, SEQUELA: Primary | ICD-10-CM

## 2025-02-13 ENCOUNTER — E-VISIT (OUTPATIENT)
Dept: TELEHEALTH | Age: 65
End: 2025-02-13
Payer: COMMERCIAL

## 2025-02-13 DIAGNOSIS — J06.9 URI WITH COUGH AND CONGESTION: Primary | ICD-10-CM

## 2025-02-13 DIAGNOSIS — R05.8 SPASMODIC COUGH: ICD-10-CM

## 2025-02-13 PROCEDURE — 99421 OL DIG E/M SVC 5-10 MIN: CPT | Performed by: PHYSICIAN ASSISTANT

## 2025-02-13 NOTE — PROGRESS NOTES
Chloe Kennedy is a 65 year old female who initiated e-visit care today.    HPI:   See answers to questionnaire submission     Current Outpatient Medications   Medication Sig Dispense Refill    potassium chloride (KLOR-CON M20) 20 MEQ Oral Tab CR Take 1 tablet (20 mEq total) by mouth daily. 90 tablet 0    atorvastatin 20 MG Oral Tab Take 1 tablet (20 mg total) by mouth daily. 90 tablet 0    hydroCHLOROthiazide 25 MG Oral Tab Take 1 tablet (25 mg total) by mouth daily. 90 tablet 3      Past Medical History:    Allergic rhinitis    Colon polyps    repeat CLN in 2029    High blood pressure    High cholesterol    Hyperlipidemia      Past Surgical History:   Procedure Laterality Date    Colonoscopy      Colonoscopy N/A 6/3/2024    Procedure: COLONOSCOPY;  Surgeon: LUANA Crystal MD;  Location: [facility] MAIN OR    Other surgical history  2015    Cosmetic - upper arm reduction    Tubal ligation  1987      Family History   Problem Relation Age of Onset    Diabetes Mother     Hypertension Mother     Diabetes Father     Hypertension Father     Cancer Maternal Grandfather     Cancer Maternal Grandmother     Asthma Daughter       Social History:  Social History     Socioeconomic History    Marital status:    Tobacco Use    Smoking status: Never    Smokeless tobacco: Never   Vaping Use    Vaping status: Never Used   Substance and Sexual Activity    Alcohol use: Yes     Comment: One glass per month    Drug use: Never   Other Topics Concern    Caffeine Concern No    Exercise Yes     Comment: Need to do more    Seat Belt Yes    Special Diet No    Stress Concern Yes     Comment: Relocating    Weight Concern Yes     Comment: Working to achieve the right number of pounds.     Social Drivers of Health     Food Insecurity: No Food Insecurity (1/18/2021)    [facility]    Hunger Vital Sign     Worried About Running Out of Food in the Last Year: Never true     Ran Out of Food in the Last Year: Never  true   Stress: No Stress Concern Present (1/18/2021)    Received from [facility], [facility] Occupational [facility] - Occupational Stress Questionnaire     Feeling of Stress : Only a little         ASSESSMENT AND PLAN:       Diagnoses and all orders for this visit:    URI with cough and congestion    Spasmodic cough     Referred in person so proper lung examination can be done.  Ill for 4 days, likely viral URI but reported cough symptoms are concerning and physical examination is warranted      Duration of  the service:  3 minutes      See [employer] message exchange and Patient Instructions for Comfort Care and patient education.

## 2025-02-14 ENCOUNTER — APPOINTMENT (OUTPATIENT)
Dept: GENERAL RADIOLOGY | Age: 65
End: 2025-02-14
Attending: PHYSICIAN ASSISTANT
Payer: COMMERCIAL

## 2025-02-14 ENCOUNTER — HOSPITAL ENCOUNTER (OUTPATIENT)
Age: 65
Discharge: HOME | End: 2025-02-14
Payer: COMMERCIAL

## 2025-02-14 VITALS
TEMPERATURE: 98 F | RESPIRATION RATE: 20 BRPM | DIASTOLIC BLOOD PRESSURE: 80 MMHG | SYSTOLIC BLOOD PRESSURE: 149 MMHG | HEART RATE: 104 BPM | OXYGEN SATURATION: 99 %

## 2025-02-14 DIAGNOSIS — R05.9 COUGH: Primary | ICD-10-CM

## 2025-02-14 DIAGNOSIS — J20.9 ACUTE BRONCHITIS, UNSPECIFIED ORGANISM: ICD-10-CM

## 2025-02-14 DIAGNOSIS — Z20.822 ENCOUNTER FOR LABORATORY TESTING FOR COVID-19 VIRUS: ICD-10-CM

## 2025-02-14 LAB
POCT INFLUENZA A: NEGATIVE
POCT INFLUENZA B: NEGATIVE
SARS-COV-2 RNA RESP QL NAA+PROBE: NOT DETECTED

## 2025-02-14 PROCEDURE — U0002 COVID-19 LAB TEST NON-CDC: HCPCS | Performed by: PHYSICIAN ASSISTANT

## 2025-02-14 PROCEDURE — 87502 INFLUENZA DNA AMP PROBE: CPT | Performed by: PHYSICIAN ASSISTANT

## 2025-02-14 PROCEDURE — 99214 OFFICE O/P EST MOD 30 MIN: CPT | Performed by: PHYSICIAN ASSISTANT

## 2025-02-14 PROCEDURE — 71046 X-RAY EXAM CHEST 2 VIEWS: CPT | Performed by: PHYSICIAN ASSISTANT

## 2025-02-14 RX ORDER — PREDNISONE 20 MG/1
40 TABLET ORAL DAILY
Qty: 10 TABLET | Refills: 0 | Status: SHIPPED | OUTPATIENT
Start: 2025-02-14 | End: 2025-02-19

## 2025-02-14 RX ORDER — PSEUDOEPHEDRINE HCL 30 MG/1
30 TABLET, FILM COATED ORAL 3 TIMES DAILY
Qty: 21 TABLET | Refills: 0 | Status: SHIPPED | OUTPATIENT
Start: 2025-02-14 | End: 2025-02-21

## 2025-02-14 RX ORDER — OXYMETAZOLINE HYDROCHLORIDE 0.05 G/100ML
1 SPRAY NASAL 2 TIMES DAILY
Qty: 15 ML | Refills: 0 | Status: SHIPPED | OUTPATIENT
Start: 2025-02-14 | End: 2025-03-16

## 2025-02-14 RX ORDER — BENZONATATE 100 MG/1
100 CAPSULE ORAL 3 TIMES DAILY PRN
Qty: 30 CAPSULE | Refills: 0 | Status: SHIPPED | OUTPATIENT
Start: 2025-02-14 | End: 2025-03-16

## 2025-02-14 RX ORDER — ALBUTEROL SULFATE 90 UG/1
2 INHALANT RESPIRATORY (INHALATION) EVERY 4 HOURS PRN
Qty: 1 EACH | Refills: 0 | Status: SHIPPED | OUTPATIENT
Start: 2025-02-14 | End: 2025-03-16

## 2025-02-14 RX ORDER — CODEINE PHOSPHATE AND GUAIFENESIN 10; 100 MG/5ML; MG/5ML
10 SOLUTION ORAL 3 TIMES DAILY PRN
Qty: 180 ML | Refills: 0 | Status: SHIPPED | OUTPATIENT
Start: 2025-02-14

## 2025-02-14 NOTE — ED PROVIDER NOTES
Patient Seen in: [facility]      History     Chief Complaint   Patient presents with    Cough/URI     Stated Complaint: flu symptoms    Subjective:   HPI  Chloe Kennedy is a 65 year old female presents with acute onset of URI symptoms x 5 days. Patient reports  sinus congestion, non productive cough, rhinorrhea.  Patient denies dysphagia, throat pain, ear pain,  fevers, chills, shortness of breath, respiratory distress, stridor, neck pain/ stiffness, headache, eye pain/ redness, facial/ lip/ eyelid swelling. No medications taken prior to arrival. No alleviating/ aggravating factors. Patient is  concerned about COVID 19 infection at this encounter.  Patient is  immunized for COVID 19.            Objective:     Past Medical History:    Allergic rhinitis    Colon polyps    repeat CLN in 2029    High blood pressure    High cholesterol    Hyperlipidemia              Past Surgical History:   Procedure Laterality Date    Colonoscopy      Colonoscopy N/A 6/3/2024    Procedure: COLONOSCOPY;  Surgeon: LUANA Crystal MD;  Location: [facility] MAIN OR    Other surgical history  2015    Cosmetic - upper arm reduction    Tubal ligation  1987                Social History     Socioeconomic History    Marital status:    Tobacco Use    Smoking status: Never    Smokeless tobacco: Never   Vaping Use    Vaping status: Never Used   Substance and Sexual Activity    Alcohol use: Yes     Comment: One glass per month    Drug use: Never   Other Topics Concern    Caffeine Concern No    Exercise Yes     Comment: Need to do more    Seat Belt Yes    Special Diet No    Stress Concern Yes     Comment: Relocating    Weight Concern Yes     Comment: Working to achieve the right number of pounds.     Social Drivers of Health     Food Insecurity: No Food Insecurity (1/18/2021)    [facility]    Hunger Vital Sign     Worried About Running Out of Food in the Last Year: Never true     Ran Out of Food  in the Last Year: Never true              Review of Systems   All other systems reviewed and are negative.      Positive for stated complaint: flu symptoms  Other systems are as noted in HPI.  Constitutional and vital signs reviewed.      All other systems reviewed and negative except as noted above.    Physical Exam     ED Triage Vitals [02/14/25 1206]   /80   Pulse 104   Resp 20   Temp 98.3 °F (36.8 °C)   Temp src Oral   SpO2 99 %   O2 Device None (Room air)       Current Vitals:   Vital Signs  BP: 149/80  Pulse: 104  Resp: 20  Temp: 98.3 °F (36.8 °C)  Temp src: Oral    Oxygen Therapy  SpO2: 99 %  O2 Device: None (Room air)        Physical Exam  Vitals and nursing note reviewed.   Constitutional:       General: She is not in acute distress.     Appearance: Normal appearance. She is normal weight. She is not ill-appearing, toxic-appearing or diaphoretic.   HENT:      Head: Normocephalic and atraumatic.      Right Ear: Tympanic membrane, ear canal and external ear normal.      Left Ear: Tympanic membrane, ear canal and external ear normal.      Nose: Congestion present. No rhinorrhea.      Mouth/Throat:      Mouth: Mucous membranes are moist.      Pharynx: Oropharynx is clear. No oropharyngeal exudate or posterior oropharyngeal erythema.   Eyes:      Extraocular Movements: Extraocular movements intact.      Conjunctiva/sclera: Conjunctivae normal.      Pupils: Pupils are equal, round, and reactive to light.   Pulmonary:      Effort: Pulmonary effort is normal. No respiratory distress.      Breath sounds: No stridor. No wheezing, rhonchi or rales.   Chest:      Chest wall: No tenderness.   Musculoskeletal:         General: No swelling, tenderness, deformity or signs of injury. Normal range of motion.      Cervical back: Normal range of motion and neck supple.   Skin:     General: Skin is warm and dry.      Capillary Refill: Capillary refill takes less than 2 seconds.      Coloration: Skin is not jaundiced or  pale.      Findings: No bruising, erythema, lesion or rash.   Neurological:      General: No focal deficit present.      Mental Status: She is alert and oriented to person, place, and time. Mental status is at baseline.   Psychiatric:         Mood and Affect: Mood normal.         Behavior: Behavior normal.             ED Course     Labs Reviewed   POCT FLU TEST - Normal    Narrative:     This assay is a rapid molecular in vitro test utilizing nucleic acid amplification of influenza A and B viral RNA.   RAPID SARS-COV-2 BY PCR - Normal     XR CHEST PA + LAT CHEST (CPT=71046)   Final Result   PROCEDURE: XR CHEST PA + LAT CHEST (CPT=71046)       COMPARISON: None.       INDICATIONS: Cough x 6 days.       TECHNIQUE:   Two views.         FINDINGS:    CARDIAC/VASC: Normal cardiac silhouette.  Unremarkable pulmonary    vasculature.     MEDIAST/CHRISTY: Atherosclerotic aorta with no visible aneurysm.     LUNGS/PLEURA: No significant pulmonary parenchymal abnormalities.  No    effusion or pleural thickening.     BONES: Mild scoliosis with mild degenerative disc disease and spondylosis.        OTHER: Negative.                     =====   CONCLUSION:    1. No acute cardiopulmonary disease                   Dictated by (CST): Elgin Rees MD on 2/14/2025 at 1:44 PM        Finalized by (CST): Elgin Rees MD on 2/14/2025 at 1:47 PM                 Vitals:    02/14/25 1206   BP: 149/80   Pulse: 104   Resp: 20   Temp: 98.3 °F (36.8 °C)     Medications - No data to display                  MDM             Medical Decision Making  65 year old well appearing female presents with acute onset of URI symptoms x 5 days.  Considerations to include but not limited to bronchitis vs pneumonia vs COVID 19 vs influenza A vs influenza B.  Patient is overall well-appearing, normotensive, nontachycardic, not dyspneic with oxygen saturation at 99% on room air    Plan  - COVID 19/ flu A and B  swab  - SpO2 99% on room air  - CXR pa/  lateral   - reassess  - DC to home   - rx: albuterol inhaler 1-2 puffs by mouth every 4-6 hours/ prn.   Prednisone 40mg po daily x 5 days. .  Afrin 2 sprays to bilateral nostrils BID for no more than 48 consecutive hours to avoid rebound congestion.  Sudafed 30mg po q 6 hours.   Tessalon 100mg PO TID.    - refer to PCP for further evaluation    - return to ED      Amount and/or Complexity of Data Reviewed  Labs: ordered. Decision-making details documented in ED Course.     Details: COVID 19/ flu A and B  swab- negative    Radiology: ordered and independent interpretation performed. Decision-making details documented in ED Course.     Details:  I personally reviewed the CXR films and no infiltrates noted.        Disposition and Plan     Clinical Impression:  1. Cough    2. Encounter for laboratory testing for COVID-19 virus    3. Acute bronchitis, unspecified organism         Disposition:  Discharge  2/14/2025  1:49 pm    Follow-up:  Lima Prajapati MD  [address] [place] [id number]  [phone]          [facility] [address] [address]  [phone]              Medications Prescribed:  Discharge Medication List as of 2/14/2025  1:57 PM        START taking these medications    Details   predniSONE 20 MG Oral Tab Take 2 tablets (40 mg total) by mouth daily for 5 days., Normal, Disp-10 tablet, R-0      guaiFENesin-codeine 100-10 MG/5ML Oral Solution Take 10 mL by mouth 3 (three) times daily as needed., Normal, Disp-180 mL, R-0      albuterol 108 (90 Base) MCG/ACT Inhalation Aero Soln Inhale 2 puffs into the lungs every 4 (four) hours as needed for Wheezing., Normal, Disp-1 each, R-0      benzonatate 100 MG Oral Cap Take 1 capsule (100 mg total) by mouth 3 (three) times daily as needed for cough., Normal, Disp-30 capsule, R-0      oxymetazoline 0.05 % Nasal Solution 1 spray by Nasal route 2 (two) times daily., Normal, Disp-15 mL, R-0      pseudoephedrine 30 MG Oral Tab Take 1 tablet  (30 mg total) by mouth in the morning, at noon, and at bedtime for 7 days., Normal, Disp-21 tablet, R-0                 Supplementary Documentation:

## 2025-02-17 ENCOUNTER — OFFICE VISIT (OUTPATIENT)
Dept: PODIATRY CLINIC | Facility: CLINIC | Age: 65
End: 2025-02-17
Payer: COMMERCIAL

## 2025-02-17 DIAGNOSIS — S86.012A ACHILLES TENDON TEAR, LEFT, INITIAL ENCOUNTER: Primary | ICD-10-CM

## 2025-02-17 NOTE — PROGRESS NOTES
Reason for Visit      Chloe Kennedy is a 65 year old female presents today complaining of left ankle pain.     History of Present Illness     Patient presents to clinic today complaining of chronic left ankle pain.  Patient saw her primary care physician earlier this year complaining of left ankle pain.  Patient is unaware if it started when she hit her ankle on the door and Achilles tendon area.  Patient went to the [facility] at that time who diagnosed her with a sprain.  Patient continued to have pain and swelling and underwent an x-ray and an MRI.  Patient states that when she does stretching exercises in the morning some of her pain is improved.  Patient travels a lot for work.  Patient has tried a compression sock which has helped.    The following portions of the patient's history were reviewed and updated as appropriate: allergies, current medications, past family history, past medical history, past social history, past surgical history and problem list.    Allergies[1]      Current Outpatient Medications:     predniSONE 20 MG Oral Tab, Take 2 tablets (40 mg total) by mouth daily for 5 days., Disp: 10 tablet, Rfl: 0    guaiFENesin-codeine 100-10 MG/5ML Oral Solution, Take 10 mL by mouth 3 (three) times daily as needed., Disp: 180 mL, Rfl: 0    albuterol 108 (90 Base) MCG/ACT Inhalation Aero Soln, Inhale 2 puffs into the lungs every 4 (four) hours as needed for Wheezing., Disp: 1 each, Rfl: 0    benzonatate 100 MG Oral Cap, Take 1 capsule (100 mg total) by mouth 3 (three) times daily as needed for cough., Disp: 30 capsule, Rfl: 0    oxymetazoline 0.05 % Nasal Solution, 1 spray by Nasal route 2 (two) times daily., Disp: 15 mL, Rfl: 0    pseudoephedrine 30 MG Oral Tab, Take 1 tablet (30 mg total) by mouth in the morning, at noon, and at bedtime for 7 days., Disp: 21 tablet, Rfl: 0    potassium chloride (KLOR-CON M20) 20 MEQ Oral Tab CR, Take 1 tablet (20 mEq total) by mouth daily., Disp: 90 tablet,  Rfl: 0    atorvastatin 20 MG Oral Tab, Take 1 tablet (20 mg total) by mouth daily., Disp: 90 tablet, Rfl: 0    hydroCHLOROthiazide 25 MG Oral Tab, Take 1 tablet (25 mg total) by mouth daily., Disp: 90 tablet, Rfl: 3    There are no discontinued medications.    Patient Active Problem List   Diagnosis    Benign essential HTN    High blood cholesterol    Hypokalemia    Prediabetes    Polyp of colon       Past Medical History:    Allergic rhinitis    Colon polyps    repeat CLN in 2029    High blood pressure    High cholesterol    Hyperlipidemia       Past Surgical History:   Procedure Laterality Date    Colonoscopy      Colonoscopy N/A 6/3/2024    Procedure: COLONOSCOPY;  Surgeon: LUANA Crystal MD;  Location: [facility] MAIN OR    Other surgical history  2015    Cosmetic - upper arm reduction    Tubal ligation  1987       Family History   Problem Relation Age of Onset    Diabetes Mother     Hypertension Mother     Diabetes Father     Hypertension Father     Cancer Maternal Grandfather     Cancer Maternal Grandmother     Asthma Daughter        Social History     Occupational History    Not on file   Tobacco Use    Smoking status: Never    Smokeless tobacco: Never   Vaping Use    Vaping status: Never Used   Substance and Sexual Activity    Alcohol use: Yes     Comment: One glass per month    Drug use: Never    Sexual activity: Not on file       ROS      Constitutional: negative for chills, fevers and sweats  Gastrointestinal: negative for abdominal pain, diarrhea, nausea and vomiting  Genitourinary:negative for dysuria and hematuria  Musculoskeletal:negative for arthralgias and muscle weakness  Neurological: negative for paresthesia and weakness  All others reviewed and negative.      Physical Exam     LE PHYSICAL EXAM    Constitution: Well-developed and well-nourished. Gait appears normal. No apparent distress. Alert and oriented to person, place, and time.  Integument: There are no varicosities. Skin appears moist, warm,  and supple with positive hair growth. There are no color changes. No open lesions. No macerations, No Hyperkeratotic lesions.  Vascular examination: Dorsalis pedis and posterior tibial pulses are strong bilaterally with capillary filling time less than 3 seconds to all digits. There is no peripheral edema..  Neurological Sensorium: Grossly intact to sharp/dull. Vibratory: Intact.  Musculoskeletal:   5/5 pedal muscle strength b/l     Edema noted to the posterolateral aspect of the left heel as well as pain on palpation to the insertion of the Achilles tendon.  No calf pain noted.  Mild tenderness to the peroneal and posterior tibial tendon.  No pain with dorsiflexion plantarflexion inversion eversion against resistance.    Assessment and Plan     Encounter Diagnoses   Name Primary?    Achilles tendon tear, left, initial encounter Yes   Reviewed MRI with patient which noted severe retrocalcaneal bursitis, partial tear of the insertion of the Achilles tendon as well as posterior and peroneal tibial tendinitis.  Discussed immobilization and protection of the walking boot as well as physical therapy.  Briefly discussed surgical intervention but would not recommended at this time.  -Fitted patient for a walking boot and placed an order for physical therapy.  Discussed that if her symptoms do not improve in 4 weeks please reach out.    Patient was instructed to call the office or on-call podiatric physician immediately with any issues or concerns before the next scheduled visit. Patient to follow-up in clinic in 4-week      Courtney Mathews DPM, SANDRA.[email or web address] [email or web address]  Diplomat, American Board of Foot and Ankle Surgery  Certified in Foot and Rearfoot/Ankle Reconstruction  Fellow of the American College of Foot and Ankle Surgeons  Fellowship Trained Foot and Ankle Surgeon   [facility]     2/17/2025    7:32 AM         [1] No Known Allergies

## 2025-03-16 DIAGNOSIS — E87.6 HYPOKALEMIA: ICD-10-CM

## 2025-03-19 RX ORDER — ATORVASTATIN CALCIUM 20 MG/1
20 TABLET, FILM COATED ORAL DAILY
Qty: 90 TABLET | Refills: 3 | Status: SHIPPED | OUTPATIENT
Start: 2025-03-19

## 2025-03-19 RX ORDER — POTASSIUM CHLORIDE 1500 MG/1
20 TABLET, EXTENDED RELEASE ORAL DAILY
Qty: 90 TABLET | Refills: 0 | Status: SHIPPED | OUTPATIENT
Start: 2025-03-19

## 2025-06-15 DIAGNOSIS — Z12.31 BREAST CANCER SCREENING BY MAMMOGRAM: ICD-10-CM

## 2025-06-15 DIAGNOSIS — E87.6 HYPOKALEMIA: Primary | ICD-10-CM

## 2025-06-15 DIAGNOSIS — I10 BENIGN ESSENTIAL HTN: ICD-10-CM

## 2025-06-17 RX ORDER — HYDROCHLOROTHIAZIDE 25 MG/1
25 TABLET ORAL DAILY
Qty: 90 TABLET | Refills: 3 | Status: SHIPPED | OUTPATIENT
Start: 2025-06-17

## 2025-06-17 RX ORDER — POTASSIUM CHLORIDE 1500 MG/1
20 TABLET, EXTENDED RELEASE ORAL DAILY
Qty: 90 TABLET | Refills: 1 | Status: SHIPPED | OUTPATIENT
Start: 2025-06-17

## 2025-06-17 NOTE — TELEPHONE ENCOUNTER
Please review: medication fails/has no protocol attached.    No future appointments with primary care medicine   Last office visit: 12/12/24    BP Readings from Last 3 Encounters:   02/14/25 149/80   12/12/24 138/86   06/03/24 144/86

## 2025-07-03 ENCOUNTER — HOSPITAL ENCOUNTER (OUTPATIENT)
Dept: MAMMOGRAPHY | Facility: HOSPITAL | Age: 65
Discharge: HOME | End: 2025-07-03
Attending: FAMILY MEDICINE
Payer: COMMERCIAL

## 2025-07-03 DIAGNOSIS — Z12.31 BREAST CANCER SCREENING BY MAMMOGRAM: ICD-10-CM

## 2025-07-03 PROCEDURE — 77067 SCR MAMMO BI INCL CAD: CPT | Performed by: FAMILY MEDICINE

## 2025-07-03 PROCEDURE — 77063 BREAST TOMOSYNTHESIS BI: CPT | Performed by: FAMILY MEDICINE

## 2025-07-10 ENCOUNTER — RESULTS FOLLOW-UP (OUTPATIENT)
Facility: CLINIC | Age: 65
End: 2025-07-10